# Patient Record
Sex: MALE | Race: BLACK OR AFRICAN AMERICAN | URBAN - METROPOLITAN AREA
[De-identification: names, ages, dates, MRNs, and addresses within clinical notes are randomized per-mention and may not be internally consistent; named-entity substitution may affect disease eponyms.]

---

## 2022-02-22 ENCOUNTER — OFFICE VISIT (OUTPATIENT)
Dept: URBAN - METROPOLITAN AREA CLINIC 51 | Facility: CLINIC | Age: 27
End: 2022-02-22
Payer: COMMERCIAL

## 2022-02-22 DIAGNOSIS — H40.1111 PRIMARY OPEN-ANGLE GLAUCOMA, MILD STAGE, RIGHT EYE: ICD-10-CM

## 2022-02-22 DIAGNOSIS — H35.413 LATTICE DEGENERATION OF RETINA, BILATERAL: ICD-10-CM

## 2022-02-22 DIAGNOSIS — H52.13 MYOPIA, BILATERAL: ICD-10-CM

## 2022-02-22 DIAGNOSIS — H40.1123 PRIMARY OPEN-ANGLE GLAUCOMA, SEVERE STAGE, LEFT EYE: Primary | ICD-10-CM

## 2022-02-22 PROCEDURE — 99204 OFFICE O/P NEW MOD 45 MIN: CPT | Performed by: OPTOMETRIST

## 2022-02-22 PROCEDURE — 92134 CPTRZ OPH DX IMG PST SGM RTA: CPT | Performed by: OPTOMETRIST

## 2022-02-22 PROCEDURE — 92133 CPTRZD OPH DX IMG PST SGM ON: CPT | Performed by: OPTOMETRIST

## 2022-02-22 PROCEDURE — 76514 ECHO EXAM OF EYE THICKNESS: CPT | Performed by: OPTOMETRIST

## 2022-02-22 RX ORDER — DORZOLAMIDE HYDROCHLORIDE AND TIMOLOL MALEATE 20; 5 MG/ML; MG/ML
SOLUTION/ DROPS OPHTHALMIC
Qty: 15 | Refills: 3 | Status: ACTIVE
Start: 2022-02-22

## 2022-02-22 ASSESSMENT — KERATOMETRY
OD: 43.67
OS: 43.95

## 2022-02-22 ASSESSMENT — VISUAL ACUITY
OS: 20/25
OD: 20/20

## 2022-02-22 ASSESSMENT — INTRAOCULAR PRESSURE
OD: 21
OS: 23

## 2022-02-22 NOTE — IMPRESSION/PLAN
Impression: Primary open-angle glaucoma, severe stage, left eye: H40.1127. *reported traumatic brain injury OS *poor compliance with med use, last used 7 months ago *transferring care from [place]
*PACHs: [facility] RNFL (02/22/2022): OD: 17um; thinning nasally OS: 46um; thinning 360 Plan: Reviewed the eye is fluid-filled glove with a lens near the front and a light-sensitive screen in the back (retina). The optic nerve conducts light signals from the retina to the brain. Eye fluid is constantly made within the eye. Excess fluid drains out into the bloodstream. Open angle glaucoma is when the fluid pressure in the eye slowly increases and damages the optic nerve. Reviewed that it is a symptomless disease. Reviewed test findings with patient and the initiation for treatment of drops will be to slow down progression of the disease but not cure it. He knows that once the damage is done, there is no reversal. 
Reviewed side effects of medication and the best method for drop instillation (supine position preferred, keep eyes closed directly after instillation for about 2 minutes and if missing a dose, wait till next dose time instead of doubling dose). Pt to start: Cosopt 1gtt BID OU (he believes the cap was a blue cap) RTC in 1 month IOP check for med efficacy and to obtain baseline HVF 24-2

## 2022-02-22 NOTE — IMPRESSION/PLAN
Impression: Lattice degeneration of retina, bilateral: H35.413. Plan: No vitreous cells, retinal tears/holes, or detachments observed today. Patient to RTC immediately if notice sudden onset or worsening flashing lights, floaters, or a curtain over vision.